# Patient Record
Sex: FEMALE | Race: WHITE | Employment: OTHER | URBAN - METROPOLITAN AREA
[De-identification: names, ages, dates, MRNs, and addresses within clinical notes are randomized per-mention and may not be internally consistent; named-entity substitution may affect disease eponyms.]

---

## 2017-07-21 ENCOUNTER — HOSPITAL ENCOUNTER (OUTPATIENT)
Dept: PHYSICAL THERAPY | Age: 77
Discharge: HOME | End: 2017-07-21
Payer: MEDICARE

## 2017-07-21 PROCEDURE — 97162 PT EVAL MOD COMPLEX 30 MIN: CPT

## 2017-07-21 PROCEDURE — 97112 NEUROMUSCULAR REEDUCATION: CPT

## 2017-07-21 PROCEDURE — G8979 MOBILITY GOAL STATUS: HCPCS

## 2017-07-21 PROCEDURE — G8978 MOBILITY CURRENT STATUS: HCPCS

## 2017-07-21 NOTE — PROGRESS NOTES
Physical Therapy Evaluation - General  See POC for subjective and objective details    Comments:   Patient is assigned a medium evaluation complexity based upon presence of multiple comorbidities, FOTO score, and chaning/progressing symptom characteristics.     Time In/Out: 10:10:50  Pain In/Out: 0, 0

## 2017-07-21 NOTE — PROGRESS NOTES
[facility] AT [address], [address] [place] [address]  Phone: [phone]   [phone]  PLAN OF CARE / [address] [place] [address] PHYSICAL THERAPY SERVICES  Patient Name: Adrienne Mcclain : 1940   Medical   Diagnosis: Vestibular disorder [H81.90] Treatment Diagnosis: H81.90   Onset Date:      Referral Source: [facility], Not On File, MD Start of Care [facility]): 2017   Prior Hospitalization: See medical history Provider #: [id number]   Prior Level of Function: Retired, independent w/ ADL's   Comorbidities: Heart disease, falls, CA history   Medications: Verified on Patient Summary List   The Plan of Care and following information is based on the information from the initial evaluation.   ===========================================================================================  Assessment / key information:  Patient is a 67 y/o female presenting with chief c/o imbalance and gait difficulty, progressively worse since . Pt has history of a fall in  with pelvic fracture and was diagnosed with acoustic neuroma in . Pt reports she is unsteady when she walks and has difficulty getting up from a chair. She is not using a cane but has one at home she has used for stairs as previously instructed. She denies recent falls, but has had close calls. She denies dizziness, and states she can walk about 2 blocks. Patient presents with decreased gait speed and widened CHRISTIANE, no assistive device. FGA score is 13/30 indicating elevated fall risk. Standing balance deficits are moderate with narrow CHRISTIANE and standard Romberg test, mod/severe deficits with foam surface or modified sharpened Romberg. Pt requires mod/max arm push-off support to perform sit to stand. The patient was instructed in a home exercise program to address the above findings/deficits.  The patient should benefit from therapy services to progress toward functional goals and improve quality of life.  ===========================================================================================  History HIGH Complexity :3+ comorbidities / personal factors will impact the outcome/ POC ; Examination HIGH Complexity : 4+ Standardized tests and measures addressing body structure, function, activity limitation and / or participation in recreation ; Presentation MEDIUM Complexity : Evolving with changing characteristics ; Decision Making MEDIUM Complexity : FOTO score of 26-74; Complexity MEDIUM  Problem List: decrease strength, impaired gait/ balance, decrease ADL/ functional abilitiies, decrease activity tolerance, decrease flexibility/ joint mobility and decrease transfer abilities   Treatment Plan may include any combination of the following: Therapeutic exercise, Neuromuscular re-education, Gait/balance training, Patient education and Functional mobility training  Patient / Family readiness to learn indicated by: asking questions, trying to perform skills and interest  Persons(s) to be included in education: patient (P)  Barriers to Learning/Limitations: None  Measures taken:    Patient Goal (s): Walk straight without bumping into things   Patient self reported health status: fair  Rehabilitation Potential: good   Short Term Goals: To be accomplished in  3-4  weeks: Independent/compliant with gaze stabilization exercises 3x/day as directed  Perform modified sharpened Romberg balance for 30 seconds without loss of balance   Long Term Goals:  To be accomplished in  4-8  weeks:  Improve FGA score by 4 points to indicate meaningful improvement in gait quality  Perform standing x1 gaze stabilization exercises without loss of balance to improve safety with head position changes  Pt will be able to perform sit to stand transfer with min/mod upper extremity push-off assistance from chair  Frequency / Duration:   Patient to be seen  2  times per week for 6-8  weeks:  Patient / Caregiver education and instruction: activity modification and exercises  G-Codes (GP): Mobility [id number] Current  CK= 40-59%   Goal  CK= 40-59%. The severity rating is based on the Other Functional Gait Assessment    Therapist Signature: Hawa Carrion PT, OCS Date: 3/21/4423   Certification Period: 7/21/17-10/20/17 Time: 10:12 AM   ===========================================================================================  I certify that the above Physical Therapy Services are being furnished while the patient is under my care. I agree with the treatment plan and certify that this therapy is necessary. Physician Signature:        Date:       Time:     Please sign and return to [facility] or you may fax the signed copy to [phone]. Thank you.

## 2017-07-25 ENCOUNTER — HOSPITAL ENCOUNTER (OUTPATIENT)
Dept: PHYSICAL THERAPY | Age: 77
Discharge: HOME | End: 2017-07-25
Payer: MEDICARE

## 2017-07-25 PROCEDURE — 97110 THERAPEUTIC EXERCISES: CPT

## 2017-07-25 PROCEDURE — 97112 NEUROMUSCULAR REEDUCATION: CPT

## 2017-07-28 ENCOUNTER — HOSPITAL ENCOUNTER (OUTPATIENT)
Dept: PHYSICAL THERAPY | Age: 77
Discharge: HOME | End: 2017-07-28
Payer: MEDICARE

## 2017-07-28 PROCEDURE — 97112 NEUROMUSCULAR REEDUCATION: CPT

## 2017-07-28 PROCEDURE — 97110 THERAPEUTIC EXERCISES: CPT

## 2017-07-28 NOTE — PROGRESS NOTES
PT DAILY TREATMENT NOTE     Patient Name: Summer Number  Date:2017  : 1940  [x]  Patient  Verified  Payor: Anna Clark / Plan: [place] MED[place] & [place] / Product Type: Medicare /    In time:10:04  Out time:10:42  Total Treatment Time (min): 38  Total Timed Codes (min): 38  1:1 Treatment Time (min): 30   Visit #: 3 of     Treatment Area: Other abnormalities of gait and mobility [R26.89]    SUBJECTIVE  Pain Level (0-10 scale): 0  Any medication changes, allergies to medications, adverse drug reactions, diagnosis change, or new procedure performed?: [x] No    [] Yes (see summary sheet for update)  Subjective functional status/changes:   [] No changes reported  My legs felt pretty good after I was here last time, I really wasn't even sore. I tend to have the most problems with my balance with making turns and bending down with coming back up. OBJECTIVE      10 min Therapeutic Exercise:  [x] See flow sheet :   Rationale: increase strength to improve the patients ability to improve functional abilities     28 min Neuromuscular Re-education:  []  See flow sheet :   Rationale: improve coordination, improve balance and increase proprioception  to improve the patients ability to stand and walk with improved stability        min Patient Education: [x] Review HEP    [] Progressed/Changed HEP based on:   [] positioning   [] body mechanics   [] transfers   [] heat/ice application        Other Objective/Functional Measures:     Pain Level (0-10 scale) post treatment: 0    ASSESSMENT/Changes in Function: Pt presented with tendency to drift/veer to the left with walking with eyes closed today probably due to unilateral vestibular deficit with R acoustic neuroma. However, Pt was able to decrease CHRISTIANE with romberg stance on level/stable surface with her eyes closed with moderate challenge with proprioceptive/balance awareness today. Will continue to progress/advance patient within current POC as tolerated with monitoring symptoms. Patient will continue to benefit from skilled PT services to modify and progress therapeutic interventions, analyze and cue movement patterns, address imbalance/dizziness and instruct in home and community integration to attain remaining goals.      []  See Plan of Care  []  See progress note/recertification  []  See Discharge Summary         Progress towards goals / Updated goals:      PLAN  [x]  Upgrade activities as tolerated     []  Continue plan of care  []  Update interventions per flow sheet       []  Discharge due to:_  []  Other:_      Frankey Horseman, PTA 7/28/2017  10:03 AM

## 2017-08-02 ENCOUNTER — HOSPITAL ENCOUNTER (OUTPATIENT)
Dept: PHYSICAL THERAPY | Age: 77
Discharge: HOME | End: 2017-08-02
Payer: MEDICARE

## 2017-08-02 PROCEDURE — 97112 NEUROMUSCULAR REEDUCATION: CPT

## 2017-08-02 PROCEDURE — 97110 THERAPEUTIC EXERCISES: CPT

## 2017-08-04 ENCOUNTER — HOSPITAL ENCOUNTER (OUTPATIENT)
Dept: PHYSICAL THERAPY | Age: 77
Discharge: HOME | End: 2017-08-04
Payer: MEDICARE

## 2017-08-04 PROCEDURE — 97112 NEUROMUSCULAR REEDUCATION: CPT

## 2017-08-04 PROCEDURE — 97110 THERAPEUTIC EXERCISES: CPT

## 2017-08-04 NOTE — PROGRESS NOTES
PT DAILY TREATMENT NOTE     Patient Name: Raf Michaud  Date:2017  : 1940  [x]  Patient  Verified  Payor: [place] MEDICARE / Plan: [place] MEDICARE PART A & B / Product Type: Medicare /    In time:1027  Out time:1113  Total Treatment Time (min): 46  Total Timed Codes (min): 46  1:1 Treatment Time (min): 46   Visit #: 5 of     Treatment Area: Other abnormalities of gait and mobility [R26.89]    SUBJECTIVE  Pain Level (0-10 scale): 0  Any medication changes, allergies to medications, adverse drug reactions, diagnosis change, or new procedure performed?: [x] No    [] Yes (see summary sheet for update)  Subjective functional status/changes:   [] No changes reported  \"I feel okay. My balance is always not good\"    OBJECTIVE    15 min Therapeutic Exercise:  [] See flow sheet :   Rationale: increase strength to improve the patients ability to stand, walk and transfer    31 min Neuromuscular Re-education:  []  See flow sheet :   Rationale: improve balance and increase proprioception  to improve the patients ability to safely perform standing/walking ADL's as well as basic changes of head and body positions    Other Objective/Functional Measures:     Pain Level (0-10 scale) post treatment: 0    ASSESSMENT/Changes in Function: Pt was able to perform all balance and card exercises with only moderate difficulty. Pt demonstrated moderate veer with ambulation with eyes closed and moderate difficulty with backwards walking. Will continue to progress therex within current POC as patient is able. Patient will continue to benefit from skilled PT services to address imbalance/dizziness to attain remaining goals.      []  See Plan of Care  []  See progress note/recertification  []  See Discharge Summary         Progress towards goals / Updated goals:      PLAN  []  Upgrade activities as tolerated     [x]  Continue plan of care  []  Update interventions per flow sheet       []  Discharge due to:_  []  Other:_ Teresa Gore, PT 8/4/2017

## 2017-08-07 ENCOUNTER — HOSPITAL ENCOUNTER (OUTPATIENT)
Dept: PHYSICAL THERAPY | Age: 77
Discharge: HOME | End: 2017-08-07
Payer: MEDICARE

## 2017-08-07 PROCEDURE — 97110 THERAPEUTIC EXERCISES: CPT

## 2017-08-07 PROCEDURE — 97112 NEUROMUSCULAR REEDUCATION: CPT

## 2017-08-10 ENCOUNTER — HOSPITAL ENCOUNTER (OUTPATIENT)
Dept: PHYSICAL THERAPY | Age: 77
Discharge: HOME | End: 2017-08-10
Payer: MEDICARE

## 2017-08-10 PROCEDURE — 97112 NEUROMUSCULAR REEDUCATION: CPT

## 2017-08-10 PROCEDURE — 97110 THERAPEUTIC EXERCISES: CPT

## 2017-08-10 NOTE — PROGRESS NOTES
PT DAILY TREATMENT NOTE     Patient Name: Gali Beverly  Date:8/10/2017  : 1940  [x]  Patient  Verified  Payor: Enedelia Estrada / Plan: [place] MEDICARE PART A & B / Product Type: Medicare /    In time:2:04  Out time:2:40  Total Treatment Time (min): 36  Total Timed Codes (min): 36  1:1 Treatment Time (min): 36   Visit #: 7 of     Treatment Area: Other abnormalities of gait and mobility [R26.89]    SUBJECTIVE  Pain Level (0-10 scale): 0  Any medication changes, allergies to medications, adverse drug reactions, diagnosis change, or new procedure performed?: [x] No    [] Yes (see summary sheet for update)  Subjective functional status/changes:   [] No changes reported  I've had some slight dizziness today. OBJECTIVE    16 min Therapeutic Exercise:  [] See flow sheet :   Rationale: increase strength and improve balance to improve the patients ability to ambulate and transfer    20 min Neuromuscular Re-education:  []  See flow sheet :   Rationale: improve balance and increase proprioception  to improve the patients ability to safely perform standing/walking ADL's as well as basic changes of head and body positions    Pain Level (0-10 scale) post treatment: 0    ASSESSMENT/Changes in Function: Pt is still challenged with incorporation of walking with head turning, which will continue to be focused on with further therapy. Patient will continue to benefit from skilled PT services to address imbalance/dizziness to attain remaining goals.      []  See Plan of Care  []  See progress note/recertification  []  See Discharge Summary           PLAN  []  Upgrade activities as tolerated     [x]  Continue plan of care  []  Update interventions per flow sheet       []  Discharge due to:_  []  Other:_      Anoop Aguilera, PT 8/10/2017  11:39 AM

## 2017-08-14 ENCOUNTER — HOSPITAL ENCOUNTER (OUTPATIENT)
Dept: PHYSICAL THERAPY | Age: 77
Discharge: HOME | End: 2017-08-14
Payer: MEDICARE

## 2017-08-14 PROCEDURE — 97112 NEUROMUSCULAR REEDUCATION: CPT

## 2017-08-14 PROCEDURE — 97110 THERAPEUTIC EXERCISES: CPT

## 2017-08-14 NOTE — PROGRESS NOTES
PT DAILY TREATMENT NOTE     Patient Name: Claribel Cuello  Date:2017  : 1940  [x]  Patient  Verified  Payor: [place] MEDICARE / Plan: [place] MEDICARE PART A & B / Product Type: Medicare /    In time:1107  Out time:1200  Total Treatment Time (min): 53  Total Timed Codes (min): 53  1:1 Treatment Time (min):53   Visit #: 8 of     Treatment Area: Other abnormalities of gait and mobility [R26.89]    SUBJECTIVE  Pain Level (0-10 scale): 0  Any medication changes, allergies to medications, adverse drug reactions, diagnosis change, or new procedure performed?: [x] No    [] Yes (see summary sheet for update)  Subjective functional status/changes:   [] No changes reported  \"I am not doing so well today. I am very dizzy\"    OBJECTIVE    23 min Therapeutic Exercise:  [] See flow sheet :   Rationale: increase strength and improve balance to improve the patients ability to ambulate and transfer    30 min Neuromuscular Re-education:  []  See flow sheet :   Rationale: improve balance and increase proprioception  to improve the patients ability to safely perform standing/walking ADL's as well as basic changes of head and body positions    Pain Level (0-10 scale) post treatment: 0    ASSESSMENT/Changes in Function: Pt reports moderate challenge today with standing and walking card exercise. Pt demonstrated moderate challenge with standing and turning her head. Patient will continue to benefit from skilled PT services to address imbalance/dizziness to attain remaining goals.      []  See Plan of Care  []  See progress note/recertification  []  See Discharge Summary           PLAN  []  Upgrade activities as tolerated     [x]  Continue plan of care  []  Update interventions per flow sheet       []  Discharge due to:_  []  Other:_      Alida Gomez, PT 2017

## 2017-08-17 ENCOUNTER — HOSPITAL ENCOUNTER (OUTPATIENT)
Dept: PHYSICAL THERAPY | Age: 77
Discharge: HOME | End: 2017-08-17
Payer: MEDICARE

## 2017-08-17 PROCEDURE — 97112 NEUROMUSCULAR REEDUCATION: CPT

## 2017-08-17 PROCEDURE — 97110 THERAPEUTIC EXERCISES: CPT

## 2017-08-17 NOTE — PROGRESS NOTES
PT DAILY TREATMENT NOTE     Patient Name: Meme Alfaro  Date:2017  : 1940  [x]  Patient  Verified  Payor: Navneet Good / Plan: [place] MEDICARE PART A & B / Product Type: Medicare /    In time:11:03  Out time:11:49  Total Treatment Time (min): 46  Total Timed Codes (min): 46  1:1 Treatment Time (min):46    Visit #: 9     Treatment Area: Other abnormalities of gait and mobility [R26.89]    SUBJECTIVE  Pain Level (0-10 scale): 0  Any medication changes, allergies to medications, adverse drug reactions, diagnosis change, or new procedure performed?: [x] No    [] Yes (see summary sheet for update)  Subjective functional status/changes:   [] No changes reported  I haven't been doing my eye exercises since I was here last because it has been aggravating my dizziness, but my allergies have been flared up lately too which may have something to do with it too. OBJECTIVE      15 min Therapeutic Exercise:  [x] See flow sheet :   Rationale: increase strength and improve balance to improve the patients ability to ambulate and transfer    31 min Neuromuscular Re-education:  []  See flow sheet :   Rationale: improve balance and increase proprioception  to improve the patients ability to safely perform standing/walking ADL's as well as basic changes of head and body positions           min Patient Education: [x] Review HEP    [] Progressed/Changed HEP based on:   [] positioning   [] body mechanics   [] transfers   [] heat/ice application        Other Objective/Functional Measures:     Pain Level (0-10 scale) post treatment: 0    ASSESSMENT/Changes in Function: Pt presented with chief c/o moderate increase in dizziness especially with changes in direction possibly aggravated by performing vestibular gaze card x1 with ambulation last treatment. Pt did present with reproducible symptoms intermittently with ambulation style dynamic balance exercises today and was unable to tolerate vestibular gaze card exercises at end of treatment today. Will continue to progress/advance patient within current POC as tolerated with monitoring symptoms. Patient will continue to benefit from skilled PT services to modify and progress therapeutic interventions, analyze and cue movement patterns, address imbalance/dizziness and instruct in home and community integration to attain remaining goals.      []  See Plan of Care  []  See progress note/recertification  []  See Discharge Summary         Progress towards goals / Updated goals:      PLAN  [x]  Upgrade activities as tolerated     []  Continue plan of care  []  Update interventions per flow sheet       []  Discharge due to:_  []  Other:_      Kylah Spivey PTA 8/17/2017  11:16 AM

## 2017-08-21 ENCOUNTER — HOSPITAL ENCOUNTER (OUTPATIENT)
Dept: PHYSICAL THERAPY | Age: 77
Discharge: HOME | End: 2017-08-21
Payer: MEDICARE

## 2017-08-21 PROCEDURE — G8979 MOBILITY GOAL STATUS: HCPCS

## 2017-08-21 PROCEDURE — 97110 THERAPEUTIC EXERCISES: CPT

## 2017-08-21 PROCEDURE — G8978 MOBILITY CURRENT STATUS: HCPCS

## 2017-08-21 PROCEDURE — 97112 NEUROMUSCULAR REEDUCATION: CPT

## 2017-08-21 NOTE — PROGRESS NOTES
[facility] AT [place] [address], [place], [address] [place] [address] Road  Phone: [phone]   [phone]  PROGRESS NOTE  Patient Name: Carlos Mujica : 1940   Treatment/Medical Diagnosis: Other abnormalities of gait and mobility [R26.89]   Referral Source: Lora Peña NP     Date of Initial Visit: 17 Attended Visits: 10 Missed Visits: 0     SUMMARY OF TREATMENT  Therapeutic exercise for proprioceptive/balance awareness including various romberg stance balance exercises, ambulation style dynamic balance activities, vestibular gaze card exercises and HEP,  CURRENT STATUS  Patient reports minimal overall improvement from therapy since initial evaluation. Pt has made some improvement with balance/proprioceptive awareness with Romberg stance activities, but minimal improvement with ambulation style dynamic balance activities. Pt still need occasional CGA to maintain her balance with performing sharpened Romberg stance, but is able to perform vestibular gaze card exercises x1 in standing without loss of balance. Pt feels that she is near maximum medical benefit/potential from current POC and will be ready for discharge after completing the last 2 remaining visits left on current script. Will continue to progress/advance patient within current POC as tolerated with monitoring symptoms. Goal/Measure of Progress Goal Met? 1.  Perform modified sharpened Romberg balance for 30 seconds without loss of balance   Status at last Eval: Progressing Current Status: Improved, but not met no   2. Improve FGA score by 4 points to indicate meaningful improvement in gait quality   Status at last Eval: FGA score= 13/30 Current Status: FGA score= 14/30 no   3. Perform standing x1 gaze stabilization exercises without loss of balance to improve safety with head position changes   Status at last Eval: Progressing Current Status: Met yes   4.   Pt will be able to perform sit to stand transfer with min/mod upper extremity push-off assistance from chair   Status at last Eval: Progressing Current Status: Met, able to perform with mod push off yes     New Goals to be achieved in __2__  treatments:  1. Perform modified sharpened Romberg balance for 30 seconds without loss of balance   2. Improve FGA score by 4 points to indicate meaningful improvement in gait quality     G-codes: Mobility  Current  CK= 40-59%   Goal  CK= 40-59%. The severity rating is based on the Other Functional Gait Assessment  RECOMMENDATIONS  Continue with current POC for 2 remaining visits left on current script with advancing as tolerated, then reassess for discharge from therapy as planned/discussed. If you have any questions/comments please contact us directly at [phone]. Thank you for allowing us to assist in the care of your patient. LPTA Signature: Kyung Morales PTA  Date: 8/21/2017   PT Signature: MICHAEL Burciaga, IRAIDA   Time: 11:05 AM   NOTE TO PHYSICIAN:  PLEASE COMPLETE THE ORDERS BELOW AND FAX TO   [facility] UNIT: [phone]. If you are unable to process this request in 24 hours please contact our office: [phone].    ___ I have read the above report and request that my patient continue as recommended.   ___ I have read the above report and request that my patient continue therapy with the following changes/special instructions:_________________________________________________________   ___ I have read the above report and request that my patient be discharged from therapy.      Physician Signature:        Date:       Time:

## 2017-08-21 NOTE — PROGRESS NOTES
PT DAILY TREATMENT NOTE     Patient Name: Maribell Regan  Date:2017  : 1940  [x]  Patient  Verified  Payor: Mateo Ann / Plan: [place] MEDICARE PART A & B / Product Type: Medicare /    In time:11:02  Out time:11:42  Total Treatment Time (min): 40  Total Timed Codes (min): 40  1:1 Treatment Time (min): 28   Visit #: 10 of     Treatment Area: Other abnormalities of gait and mobility [R26.89]    SUBJECTIVE  Pain Level (0-10 scale): 0  Any medication changes, allergies to medications, adverse drug reactions, diagnosis change, or new procedure performed?: [x] No    [] Yes (see summary sheet for update)  Subjective functional status/changes:   [] No changes reported  My legs have still been feeling wobbly and if I am not dizzy I am always on the brink of feeling dizzy. OBJECTIVE       15 min Therapeutic Exercise:  [x] See flow sheet :   Rationale: increase strength and improve balance to improve the patients ability to ambulate and transfer      25 min Neuromuscular Re-education:  []  See flow sheet :   Rationale: improve balance and increase proprioception  to improve the patients ability to safely perform standing/walking ADL's as well as basic changes of head and body positions           min Patient Education: [x] Review HEP    [] Progressed/Changed HEP based on:   [] positioning   [] body mechanics   [] transfers   [] heat/ice application        Other Objective/Functional Measures:     Pain Level (0-10 scale) post treatment: 0    ASSESSMENT/Changes in Function:     Patient will continue to benefit from skilled PT services to modify and progress therapeutic interventions, analyze and cue movement patterns, address imbalance/dizziness and instruct in home and community integration to attain remaining goals.      []  See Plan of Care  [x]  See progress note/recertification  []  See Discharge Summary         Progress towards goals / Updated goals:  See Progress note/Physician update for full detailed progress towards established goals.     PLAN  [x]  Upgrade activities as tolerated     []  Continue plan of care  []  Update interventions per flow sheet       []  Discharge due to:_  []  Other:_      Sarah Sharma PTA 8/21/2017  10:58 AM

## 2017-08-24 ENCOUNTER — HOSPITAL ENCOUNTER (OUTPATIENT)
Dept: PHYSICAL THERAPY | Age: 77
Discharge: HOME | End: 2017-08-24
Payer: MEDICARE

## 2017-08-24 PROCEDURE — G8979 MOBILITY GOAL STATUS: HCPCS

## 2017-08-24 PROCEDURE — 97112 NEUROMUSCULAR REEDUCATION: CPT

## 2017-08-24 PROCEDURE — G8980 MOBILITY D/C STATUS: HCPCS

## 2017-08-24 PROCEDURE — 97110 THERAPEUTIC EXERCISES: CPT

## 2017-08-24 NOTE — PROGRESS NOTES
[facility] AT [address], [address] [place] [address]  Phone: [phone]   [phone]  DISCHARGE SUMMARY  Patient Name: Claribel Cuello : 1940   Treatment/Medical Diagnosis: Other abnormalities of gait and mobility [R26.89]   Referral Source: Laurita Clark NP     Date of Initial Visit: 17 Attended Visits: 11 Missed Visits: 0     SUMMARY OF TREATMENT  Therapeutic exercise for proprioceptive/balance awareness including various romberg stance balance exercises, ambulation style dynamic balance activities, vestibular gaze card exercises and HEP,  CURRENT STATUS  Patient reports minimal overall improvement from therapy since initial evaluation. Pt has made some improvement with balance/proprioceptive awareness with Romberg stance activities, but minimal improvement with ambulation style dynamic balance activities. Pt still need occasional CGA to maintain her balance with performing sharpened Romberg stance, but is able to perform vestibular gaze card exercises x1 in standing without loss of balance. Pt was given and instructed in an updated HEP for continued self maintenance of reduced sxs after D/C from therapy.       Goal/Measure of Progress Goal Met? 1.  Perform modified sharpened Romberg balance for 30 seconds without loss of balance   Status at last Eval: Improved, but not met Current Status: Improved, but not met no   2. Improve FGA score by 4 points to indicate meaningful improvement in gait quality   Status at last Eval: FGA score= 14/30 Current Status: FGA score= 14/30 no   G-codes=Mobility   Goal  CK= 40-59%  D/C  CK= 40-59%. The severity rating is based on the Other Functional Gait Assessment  RECOMMENDATIONS  Patient feels that she has achieved maximum medical benefit/potential from current POC after completing 11 of 12 prescribed visits and is ready for discharge from therapy at this time.     If you have any questions/comments please contact us directly at [phone]. Thank you for allowing us to assist in the care of your patient.     LPTA Signature: Gila Aquino PTA Date: 8/24/17   Therapist Signature: MICHAEL Vaca, [facility]   Time: 12:39 PM

## 2017-08-24 NOTE — PROGRESS NOTES
PT DAILY TREATMENT NOTE     Patient Name: Nury Orourke  Date:2017  : 1940  [x]  Patient  Verified  Payor: Naty Paulino / Plan: [place] / Product Type: Medicare /    In time:11:00  Out time:11:40  Total Treatment Time (min): 40  Total Timed Codes (min): 30  1:1 Treatment Time (min): 30   Visit #:     Treatment Area: Other abnormalities of gait and mobility [R26.89]    SUBJECTIVE  Pain Level (0-10 scale): R knee=8/10  Any medication changes, allergies to medications, adverse drug reactions, diagnosis change, or new procedure performed?: [x] No    [] Yes (see summary sheet for update)  Subjective functional status/changes:   [] No changes reported  My right knee is hurting really bad today from doing some cleaning in a squatted position for about a half an hour since I was here last, so I don't know hor much I will be able to do. OBJECTIVE         10 min Therapeutic Exercise:  [x] See flow sheet :   Rationale: increase strength and improve balance to improve the patients ability to ambulate and transfer      20 min Neuromuscular Re-education:  [x]  See flow sheet :   Rationale: improve balance and increase proprioception  to improve the patients ability to safely perform standing/walking ADL's as well as basic changes of head and body positions           min Patient Education: [x] Review HEP    [] Progressed/Changed HEP based on:   [] positioning   [] body mechanics   [] transfers   [] heat/ice application        Other Objective/Functional Measures:     Pain Level (0-10 scale) post treatment: 8/10 R knee    ASSESSMENT/Changes in Function:        []  See Plan of Care  []  See progress note/recertification  [x]  See Discharge Summary         Progress towards goals / Updated goals:  See discharge summary for full final detailed progress towards all established goals.      PLAN  []  Upgrade activities as tolerated     []  Continue plan of care  []  Update interventions per flow sheet       [x]  Discharge due to:Patient feels that she has achieved maximum medical benefit/potential from current POC after completing 11 of 12 prescribed visits and is ready for discharge from therapy at this time.   []  Other:_      Benji Gr, TELLO 8/24/2017  10:53 AM

## 2017-08-28 ENCOUNTER — APPOINTMENT (OUTPATIENT)
Dept: PHYSICAL THERAPY | Age: 77
End: 2017-08-28
Payer: MEDICARE

## 2017-08-31 ENCOUNTER — APPOINTMENT (OUTPATIENT)
Dept: PHYSICAL THERAPY | Age: 77
End: 2017-08-31
Payer: MEDICARE

## 2017-09-05 ENCOUNTER — APPOINTMENT (OUTPATIENT)
Dept: PHYSICAL THERAPY | Age: 77
End: 2017-09-05

## 2017-09-07 ENCOUNTER — APPOINTMENT (OUTPATIENT)
Dept: PHYSICAL THERAPY | Age: 77
End: 2017-09-07

## 2019-09-24 ENCOUNTER — HOSPITAL ENCOUNTER (OUTPATIENT)
Dept: PHYSICAL THERAPY | Age: 79
Discharge: HOME | End: 2019-09-24
Payer: MEDICARE

## 2019-09-24 PROCEDURE — 97110 THERAPEUTIC EXERCISES: CPT

## 2019-09-24 PROCEDURE — 97161 PT EVAL LOW COMPLEX 20 MIN: CPT

## 2019-09-24 NOTE — PROGRESS NOTES
PHYSICAL THERAPY - DAILY TREATMENT NOTE    Patient Name: Sisi Salvador        Date: 2019  : 1940   yes Patient  Verified  Visit #:   1     Insurance: Payor: Darrell Grey / Plan: [place] MEDICARE PART A & B / Product Type: Medicare /      In time: 9:58 Out time: 11:00   Total Treatment Time: 62     Medicare/BCBS Time Tracking (below)   Total Timed Codes (min):  62 1:1 Treatment Time:  62     TREATMENT AREA =  Unsteadiness on feet [R26.81]  Left knee pain [M25.562]  Knee pain, right [M25.561]    SUBJECTIVE  Pain Level (on 0 to 10 scale):  0  / 10   Medication Changes/New allergies or changes in medical history, any new surgeries or procedures?    no  If yes, update Summary List   Subjective Functional Status/Changes:  []  No changes reported     See POC          OBJECTIVE    See exam on chart for details on objective findings        15 min Therapeutic Exercise:  [x]  See flow sheet   Rationale:      increase ROM and increase strength to improve the patients ability to transfer and ambulate safely           Billed With/As:   [x] TE   [] TA   [] Neuro   [] Self Care Patient Education: [x] Review HEP    [] Progressed/Changed HEP based on:   [] positioning   [] body mechanics   [] transfers   [] heat/ice application    [] other:      Other Objective/Functional Measures:    See POC     Post Treatment Pain Level (on 0 to 10) scale:   0  / 10     ASSESSMENT  Assessment/Changes in Function:     See POC     []  See Progress Note/Recertification   Patient will continue to benefit from skilled PT services to modify and progress therapeutic interventions, address functional mobility deficits, address ROM deficits, address strength deficits, analyze and address soft tissue restrictions, analyze and cue movement patterns, analyze and modify body mechanics/ergonomics, assess and modify postural abnormalities and address imbalance/dizziness to attain remaining goals.    Progress toward goals / Updated goals:    See POC     PLAN  []  Upgrade activities as tolerated yes Continue plan of care   []  Discharge due to :    []  Other:      Therapist: Pasquale Klein.  Mainor Martinez, [place]    Date: 9/24/2019 Time: 9:56 AM     Future Appointments   Date Time Provider Birdie Petty   9/24/2019 10:00 AM Terrence Jain, PT [facility]

## 2019-09-27 ENCOUNTER — HOSPITAL ENCOUNTER (OUTPATIENT)
Dept: PHYSICAL THERAPY | Age: 79
Discharge: HOME | End: 2019-09-27
Payer: MEDICARE

## 2019-09-27 PROCEDURE — 97110 THERAPEUTIC EXERCISES: CPT

## 2019-09-27 NOTE — PROGRESS NOTES
PHYSICAL THERAPY - DAILY TREATMENT NOTE    Patient Name: Kim Cohn        Date: 2019  : 1940   yes Patient  Verified  Visit #:   2     Insurance: Payor: Ashwini Gal / Plan: [place] MEDICARE PART A & B / Product Type: Medicare /      In time: 9:49 Out time: 10:21   Total Treatment Time: 32     Medicare/BCBS Time Tracking (below)   Total Timed Codes (min):  32 1:1 Treatment Time:  32     TREATMENT AREA =  Unsteadiness on feet [R26.81]  Left knee pain [M25.562]  Knee pain, right [M25.561]    SUBJECTIVE  Pain Level (on 0 to 10 scale):  0  / 10   Medication Changes/New allergies or changes in medical history, any new surgeries or procedures?    no  If yes, update Summary List   Subjective Functional Status/Changes:  []  No changes reported     Pt states she tried her HEP at home and had some Left hip/groin pain with hip extension and had to stop. OBJECTIVE      32 min Therapeutic Exercise:  [x]  See flow sheet   Rationale:      increase ROM and increase strength to improve the patients ability to tolerate standing/walking activities         Billed With/As:   [x] TE   [] TA   [] Neuro   [] Self Care Patient Education: [x] Review HEP    [] Progressed/Changed HEP based on:   [] positioning   [] body mechanics   [] transfers   [] heat/ice application    [] other:      Other Objective/Functional Measures:    -new exercises added as per flow sheet with vc's provided for form/technique 100% of the time. -c/o pain to R knee with R step ups after 9 reps     Post Treatment Pain Level (on 0 to 10) scale:   0  / 10     ASSESSMENT  Assessment/Changes in Function:     Pt with mild fatigue noted after exercises, denies pain increase indicating appropriate challenge. Pt ed on DOMS and to continue with HEP as tolerated over the weekend.      []  See Progress Note/Recertification   Patient will continue to benefit from skilled PT services to modify and progress therapeutic interventions, address functional mobility deficits, address ROM deficits, address strength deficits, analyze and address soft tissue restrictions, analyze and cue movement patterns, analyze and modify body mechanics/ergonomics, assess and modify postural abnormalities and address imbalance/dizziness to attain remaining goals   Progress toward goals / Updated goals:    No significant changes yet due to 1st f/u. PLAN  []  Upgrade activities as tolerated yes Continue plan of care   []  Discharge due to :    []  Other:      Therapist: Jonathan Coe.  Samina Sarabia PT    Date: 9/27/2019 Time: 10:04 AM     Future Appointments   Date Time Provider Birdie Petty   10/8/2019  2:00 PM Andrés Mccurdy, PTA [facility]   10/11/2019 11:15 AM Asher NAZARIO, PT [facility]   10/15/2019  2:00 PM Ok Curb., PT [facility]   10/18/2019  2:00 PM Marcels Allegra, PTA [facility]   10/22/2019  2:00 PM Ok Curb., PT [facility]   10/25/2019  2:00 PM Marcels Allegra, PTA [facility]   10/29/2019  2:00 PM Ok Curb., PT [facility]   11/1/2019 10:30 AM Ok Curb., PT [facility]

## 2019-10-01 ENCOUNTER — APPOINTMENT (OUTPATIENT)
Dept: PHYSICAL THERAPY | Age: 79
End: 2019-10-01
Payer: MEDICARE

## 2019-10-04 ENCOUNTER — APPOINTMENT (OUTPATIENT)
Dept: PHYSICAL THERAPY | Age: 79
End: 2019-10-04
Payer: MEDICARE

## 2019-10-08 ENCOUNTER — HOSPITAL ENCOUNTER (OUTPATIENT)
Dept: PHYSICAL THERAPY | Age: 79
Discharge: HOME | End: 2019-10-08
Payer: MEDICARE

## 2019-10-08 PROCEDURE — 97110 THERAPEUTIC EXERCISES: CPT

## 2019-10-08 PROCEDURE — 97112 NEUROMUSCULAR REEDUCATION: CPT

## 2019-10-08 NOTE — PROGRESS NOTES
PT DAILY TREATMENT NOTE     Patient Name: Danish May  Date:10/8/2019  : 1940  [x]  Patient  Verified  Payor: [place] MEDICARE / Plan: [place] MEDICARE PART A & B / Product Type: Medicare /    In time:2:05  Out time:2:57  Total Treatment Time (min): 52  Total Timed Codes (min): 52  1:1 Treatment Time (min): 40   Visit #: 3 of     Treatment Area: Unsteadiness on feet [R26.81]  Left knee pain [M25.562]  Knee pain, right [M25.561]    SUBJECTIVE  Pain Level (0-10 scale): 0  Any medication changes, allergies to medications, adverse drug reactions, diagnosis change, or new procedure performed?: [x] No    [] Yes (see summary sheet for update)  Subjective functional status/changes:   [] No changes reported  I fell backwards in my bedroom yesterday when my knee buckled on me after I stood up from getting out of bed, but I didn't hurt myself. OBJECTIVE  42 min Therapeutic Exercise:  [x] See flow sheet :   Rationale: increase ROM and increase strength to improve the patients ability to tolerate standing/walking activities     10 min Neuromuscular Re-education:  [x]  See flow sheet :   Rationale: improve balance and increase proprioception  to improve the patients ability to increase confidence and stability with standing/ambulation ADL's        min Patient Education: [x] Review HEP    [] Progressed/Changed HEP based on:   [] positioning   [] body mechanics   [] transfers   [] heat/ice application        Other Objective/Functional Measures:     Pain Level (0-10 scale) post treatment: 0    ASSESSMENT/Changes in Function: Pt presented with chief c/o recent fall in her home where she fell backwards due to her right knee buckling into flexion requiring assistance to stand up. Pt was able to tolerate full normal therex regiment including addition of standing theraband TKE to address right knee instability as well as increasing reps with some other therex with min to mod challenge today. Will continue to progress/advance patient within current POC as tolerated with monitoring symptoms. Patient will continue to benefit from skilled PT services to modify and progress therapeutic interventions, address functional mobility deficits, address ROM deficits, address strength deficits and analyze and cue movement patterns to attain remaining goals. []  See Plan of Care  []  See progress note/recertification  []  See Discharge Summary         Progress towards goals / Updated goals: · Short Term Goals: To be accomplished in  2  weeks:  1. Pt will be educated in appropriate HEP to decrease pain, increase ROM, increase strength and return pt to PLOF.    2. Patient will report at least 25% functional improvement with standing, walking ADL's.      · Long Term Goals: To be accomplished in  4-6  weeks:  1. Pt will improve FOTO score to >/= 44 to demo a significant improvement in functional activity tolerance. 2. Pt will achieve >/= +5 GROC in order to promote increased activity tolerance. 3.  Increase score on Tinetti to >/=  21/28 indicating decreased fall risk with ambulation.    4.  Pt will demonstrate sit<->stand from standard chair height with minimal UE support x 1 attempt for improved ease with toilet transfers.       PLAN  [x]  Upgrade activities as tolerated     []  Continue plan of care  []  Update interventions per flow sheet       []  Discharge due to:_  []  Other:_      Eleno Paniagua PTA 10/8/2019  1:39 PM      Future Appointments   Date Time Provider Birdie Petty   10/8/2019  2:00 PM Tyron Russ, PTA [facility]   10/11/2019  3:30 PM Tyron Russ, PTA [facility]   10/15/2019  2:00 PM Loren Duane., PT [facility]   10/18/2019  2:00 PM Tyron Russ, PTA [facility]   10/22/2019  2:00 PM Loren Duane., PT [facility]   10/25/2019  2:00 PM Tyron Russ, PTA [facility]   10/29/2019  2:00 PM Loren Duane., PT [facility]   11/1/2019 10:30 AM Loren Ko PT [facility]

## 2019-10-11 ENCOUNTER — HOSPITAL ENCOUNTER (OUTPATIENT)
Dept: PHYSICAL THERAPY | Age: 79
Discharge: HOME | End: 2019-10-11
Payer: MEDICARE

## 2019-10-11 PROCEDURE — 97112 NEUROMUSCULAR REEDUCATION: CPT

## 2019-10-11 PROCEDURE — 97110 THERAPEUTIC EXERCISES: CPT

## 2019-10-11 NOTE — PROGRESS NOTES
PT DAILY TREATMENT NOTE     Patient Name: Fide Galo  Date:10/11/2019  : 1940  [x]  Patient  Verified  Payor: [place] MEDICARE / Plan: [place] MEDICARE PART A & B / Product Type: Medicare /    In time:3:34  Out time:4:28  Total Treatment Time (min): 54  Total Timed Codes (min): 54  1:1 Treatment Time (min): 47   Visit #: 4 of     Treatment Area: Unsteadiness on feet [R26.81]  Left knee pain [M25.562]  Knee pain, right [M25.561]    SUBJECTIVE  Pain Level (0-10 scale): right knee=8/10  Any medication changes, allergies to medications, adverse drug reactions, diagnosis change, or new procedure performed?: [x] No    [] Yes (see summary sheet for update)  Subjective functional status/changes:   [] No changes reported  My right knee has been hurting a lot on and off since I was here last and it has been black and blue from the fall that I took before I was here last, but it hasn't buckled on me since. OBJECTIVE      44 min Therapeutic Exercise:  [x] See flow sheet :   Rationale: increase ROM and increase strength to improve the patients ability to tolerate standing/walking activities    10 min Neuromuscular Re-education:  [x]  See flow sheet :   Rationale: improve balance and increase proprioception  to improve the patients ability to increase confidence and stability with standing/ambulation ADL's        min Patient Education: [x] Review HEP    [] Progressed/Changed HEP based on:   [] positioning   [] body mechanics   [] transfers   [] heat/ice application        Other Objective/Functional Measures:     Pain Level (0-10 scale) post treatment: 0    ASSESSMENT/Changes in Function: Pt was able to advance to close Romberg stance on foam with moderate challenge with proprioceptive/balance awareness today. Pt tends to be unsafe/impulsive with ambulation style drills without rollator as well as with transfers needing reminders for safety. Will continue to progress/advance patient within current POC as tolerated with monitoring symptoms. Patient will continue to benefit from skilled PT services to modify and progress therapeutic interventions, address functional mobility deficits, address ROM deficits, address strength deficits and analyze and cue movement patterns to attain remaining goals. []  See Plan of Care  []  See progress note/recertification  []  See Discharge Summary         Progress towards goals / Updated goals: · Short Term Goals: To be accomplished in  2  weeks:  1.  Pt will be educated in appropriate HEP to decrease pain, increase ROM, increase strength and return pt to OF.    2. Patient will report at least 25% functional improvement with standing, walking ADL's.10/11/19: Not Met, Pt reports minimal functional improvement with standing, walking ADL's since initial evaluation; approximately 3-5%     · Long Term Goals: To be accomplished in  4-6  weeks:  1. Pt will improve FOTO score to >/= 44 to demo a significant improvement in functional activity tolerance. 2. Pt will achieve >/= +5 GROC in order to promote increased activity tolerance. 3.  Increase score on Tinetti to >/=  21/28 indicating decreased fall risk with ambulation.    4.  Pt will demonstrate sit<->stand from standard chair height with minimal UE support x 1 attempt for improved ease with toilet transfers.       PLAN  [x]  Upgrade activities as tolerated     []  Continue plan of care  []  Update interventions per flow sheet       []  Discharge due to:_  []  Other:_      Herbert López PTA 10/11/2019  3:47 PM      Future Appointments   Date Time Provider Birdie Petty   10/15/2019  2:00 PM Bertrand Burleson, PT [facility]   10/18/2019  2:00 PM Roberta Martinez PTA [facility]   10/22/2019  2:00 PM Bertrand Burleson, PT [facility]   10/25/2019  2:00 PM Roberta Martinez PTA [facility]   10/29/2019  2:00 PM Bertrand Burleson, PT [facility]   11/1/2019 10:30 AM Bertrand Burleson, PT [facility]

## 2019-10-15 ENCOUNTER — HOSPITAL ENCOUNTER (OUTPATIENT)
Dept: PHYSICAL THERAPY | Age: 79
Discharge: HOME | End: 2019-10-15
Payer: MEDICARE

## 2019-10-15 PROCEDURE — 97110 THERAPEUTIC EXERCISES: CPT

## 2019-10-15 PROCEDURE — 97112 NEUROMUSCULAR REEDUCATION: CPT

## 2019-10-15 NOTE — PROGRESS NOTES
PHYSICAL THERAPY - DAILY TREATMENT NOTE    Patient Name: Cheryl Rivas        Date: 10/15/2019  : 1940   yes Patient  Verified  Visit #:   5     Insurance: Payor: Vince Hi / Plan: [place] MEDICARE PART A & B / Product Type: Medicare /      In time: 2:07 PM Out time: 3:01   Total Treatment Time: 54     Medicare/BCBS Time Tracking (below)   Total Timed Codes (min):  54 1:1 Treatment Time:  40     TREATMENT AREA =  Unsteadiness on feet [R26.81]  Left knee pain [M25.562]  Knee pain, right [M25.561]    SUBJECTIVE  Pain Level (on 0 to 10 scale):  7  / 10   Medication Changes/New allergies or changes in medical history, any new surgeries or procedures?    no  If yes, update Summary List   Subjective Functional Status/Changes:  []  No changes reported     Pt states she feels that she is getting stronger. Obtained OTC shoe inserts, but forgot to bring them in. She is interested in having therapist look at them before she opens the packaging; also wondering about possibly trying Vasyli inserts in clinic. OBJECTIVE      25  (39) min Therapeutic Exercise:  [x]  See flow sheet   Rationale:     increase ROM and increase strength to improve the patients ability to tolerate standing/walking activities      10 min Neuromuscular Re-ed: [x]  See flow sheet   Rationale:  improve balance and increase proprioception  to improve the patients ability to increase confidence and stability with standing/ambulation ADL's      5 min Gait Training:  _100__ feet with _R SPC__ device on level surfaces with _distant supervision__ level of assist; including asphalt terrain. Pt amb up/down curb step with SPC and close supervision; vc's for up with good, down with bad (RLE).    Rationale:     Billed With/As:   [x] TE   [] TA   [] Neuro   [] Self Care Patient Education: [x] Review HEP    [] Progressed/Changed HEP based on:   [] positioning   [] body mechanics   [] transfers   [] heat/ice application    [] other:      Other Objective/Functional Measures:    -cued to maintain TKE with toe taps; progressed to 6 inch  -c/o L knee pain with SLR at 5 reps; d/c'd and regressed to QS  -increased to 2.5# LAQ     Post Treatment Pain Level (on 0 to 10) scale:   0  / 10     ASSESSMENT  Assessment/Changes in Function:     Pt demonstrates slow, steady progress with functional LE strength demonstrating increased upright posture in standing and during standing exercises. Plan continued progression of PRE's to promote LTG attainment. []  See Progress Note/Recertification   Patient will continue to benefit from skilled PT services to modify and progress therapeutic interventions, address functional mobility deficits, address ROM deficits, address strength deficits and analyze and cue movement patterns to attain remaining goals. Progress toward goals / Updated goals: · Short Term Goals: To be accomplished in  2  weeks:  1.  Pt will be educated in appropriate HEP to decrease pain, increase ROM, increase strength and return pt to PLOF.    2. Patient will report at least 25% functional improvement with standing, walking ADL's.10/11/19: Not Met, Pt reports minimal functional improvement with standing, walking ADL's since initial evaluation; approximately 3-5%     · Long Term Goals: To be accomplished in  4-6  weeks:  1. Pt will improve FOTO score to >/= 44 to demo a significant improvement in functional activity tolerance. 2. Pt will achieve >/= +5 GROC in order to promote increased activity tolerance. 3.  Increase score on Tinetti to >/=  21/28 indicating decreased fall risk with ambulation.    4. Pt will demonstrate sit<->stand from standard chair height with minimal UE support x 1 attempt for improved ease with toilet transfers. PLAN  []  Upgrade activities as tolerated yes Continue plan of care   []  Discharge due to :    []  Other:      Therapist: Augusta Antonio.  Matthew Lowry, PT    Date: 10/15/2019 Time: 5:44 PM      Future Appointments Date Time Provider Birdie Petty   10/15/2019  2:00 PM Jina Plana., PT [facility]   10/18/2019  2:00 PM Benjamin Sanchez, PTA [facility]   10/22/2019  2:00 PM Jina Plana., PT [facility]   10/25/2019  2:00 PM Benjamin Sanchez, PTA [facility]   10/29/2019  2:00 PM Jina Plana., PT [facility]   11/1/2019 10:30 AM Jina Plana., PT [facility]

## 2019-10-18 ENCOUNTER — HOSPITAL ENCOUNTER (OUTPATIENT)
Dept: PHYSICAL THERAPY | Age: 79
Discharge: HOME | End: 2019-10-18
Payer: MEDICARE

## 2019-10-18 PROCEDURE — 97110 THERAPEUTIC EXERCISES: CPT

## 2019-10-18 PROCEDURE — 97112 NEUROMUSCULAR REEDUCATION: CPT

## 2019-10-18 NOTE — PROGRESS NOTES
PT DAILY TREATMENT NOTE     Patient Name: Елена Bunn  Date:10/18/2019  : 1940  [x]  Patient  Verified  Payor: Rubin Bloch / Plan: [place] MEDICARE PART A & B / Product Type: Medicare /    In time:11:30  Out time:12:26  Total Treatment Time (min): 56  Total Timed Codes (min): 56  1:1 Treatment Time (min): 56   Visit #: 6 of     Treatment Area: Unsteadiness on feet [R26.81]  Left knee pain [M25.562]  Knee pain, right [M25.561]    SUBJECTIVE  Pain Level (0-10 scale): 7/10 right knee   Any medication changes, allergies to medications, adverse drug reactions, diagnosis change, or new procedure performed?: [x] No    [] Yes (see summary sheet for update)  Subjective functional status/changes:   [] No changes reported  I feel like my balance has gotten a little bit better with walking around the house, but my knee is still bothering me a lot especially when I lay flat in the bed. OBJECTIVE      46 min Therapeutic Exercise:  [x] See flow sheet :   Rationale: increase ROM and increase strength to improve the patients ability to tolerate standing/walking activities     10 min Neuromuscular Re-education:  [x]  See flow sheet :   Rationale: improve balance and increase proprioception  to improve the patients ability to increase confidence and stability with standing/ambulation ADL's           min Patient Education: [x] Review HEP    [] Progressed/Changed HEP based on:   [] positioning   [] body mechanics   [] transfers   [] heat/ice application        Other Objective/Functional Measures:     Pain Level (0-10 scale) post treatment: 0    ASSESSMENT/Changes in Function: Pt presents with the most functional improvement with increased confidence with household standing/ambulation ADL's over the past 2-3 treatments. Pt was also able to tolerate increased resistance with theraband TKE as well as advancing to HHA with backwards walking with min to mod challenge today. Will continue to progress/advance patient within current POC as tolerated with monitoring symptoms. Patient will continue to benefit from skilled PT services to modify and progress therapeutic interventions, address functional mobility deficits, address ROM deficits, address strength deficits, analyze and cue movement patterns and address imbalance/dizziness to attain remaining goals. []  See Plan of Care  []  See progress note/recertification  []  See Discharge Summary         Progress towards goals / Updated goals: · Short Term Goals: To be accomplished in  2  weeks:  1.  Pt will be educated in appropriate HEP to decrease pain, increase ROM, increase strength and return pt to PLOF.    2. Patient will report at least 25% functional improvement with standing, walking ADL's.10/11/19: Not Met, Pt reports minimal functional improvement with standing, walking ADL's since initial evaluation; approximately 3-5%     · Long Term Goals: To be accomplished in  4-6  weeks:  1. Pt will improve FOTO score to >/= 44 to demo a significant improvement in functional activity tolerance. 2. Pt will achieve >/= +5 GROC in order to promote increased activity tolerance. 3.  Increase score on Tinetti to >/=  21/28 indicating decreased fall risk with ambulation.    4. Pt will demonstrate sit<->stand from standard chair height with minimal UE support x 1 attempt for improved ease with toilet transfers.     PLAN  [x]  Upgrade activities as tolerated     []  Continue plan of care  []  Update interventions per flow sheet       []  Discharge due to:_  []  Other:_      Shannan King, PTA 10/18/2019  11:42 AM      Future Appointments   Date Time Provider Birdie Petty   10/22/2019  2:00 PM Saige Knight., PT [facility]   10/25/2019  2:00 PM Lakisha Ribera PTA [facility]   10/29/2019  2:00 PM Saige Knight., PT [facility]   11/1/2019 10:30 AM Saige Knight., PT [facility]

## 2019-10-22 ENCOUNTER — HOSPITAL ENCOUNTER (OUTPATIENT)
Dept: PHYSICAL THERAPY | Age: 79
Discharge: HOME | End: 2019-10-22
Payer: MEDICARE

## 2019-10-22 PROCEDURE — 97112 NEUROMUSCULAR REEDUCATION: CPT

## 2019-10-22 PROCEDURE — 97110 THERAPEUTIC EXERCISES: CPT

## 2019-10-22 NOTE — PROGRESS NOTES
PT DAILY TREATMENT NOTE     Patient Name: Simon Connelly  Date:10/22/2019  : 1940  [x]  Patient  Verified  Payor: [place] MEDICARE / Plan: [place] MEDICARE PART A & B / Product Type: Medicare /    In time:2:03  Out time:2:58  Total Treatment Time (min): 54  Total Timed Codes (min): 54  1:1 Treatment Time (min): 54  Visit #: 7 of     Treatment Area: Unsteadiness on feet [R26.81]  Left knee pain [M25.562]  Knee pain, right [M25.561]    SUBJECTIVE  Pain Level (0-10 scale): 7/10  Any medication changes, allergies to medications, adverse drug reactions, diagnosis change, or new procedure performed?: [x] No    [] Yes (see summary sheet for update)  Subjective functional status/changes:   [] No changes reported  My legs have been feeling a little bit stronger lately, I was even able to swiffer the whole kitchen on my own yesterday,which I haven't been able to do in sometime. OBJECTIVE      44 min Therapeutic Exercise:  [x] See flow sheet :   Rationale: increase ROM and increase strength to improve the patients ability to tolerate standing/walking activities    10 min Neuromuscular Re-education:  []  See flow sheet :   Rationale: improve balance and increase proprioception  to improve the patients ability to increase confidence and stability with standing/ambulation ADL's           min Patient Education: [x] Review HEP    [] Progressed/Changed HEP based on:   [] positioning   [] body mechanics   [] transfers   [] heat/ice application        Other Objective/Functional Measures:     Pain Level (0-10 scale) post treatment: 0    ASSESSMENT/Changes in Function: Pt was able to advance from close CHRISTIANE to modified sharpened Romberg stance with moderate challenge with proprioceptive/balance awareness today. Otherwise, Pt reports the most significant functional improvement with ability to perform light standing household chores with increased confidence since last treatment. Will review detailed progress/goals for physician update next treatment with continuing to progress/advance within current POC/protocol as tolerated. Patient will continue to benefit from skilled PT services to modify and progress therapeutic interventions, address functional mobility deficits, address ROM deficits, address strength deficits, analyze and cue movement patterns and address imbalance/dizziness to attain remaining goals. []  See Plan of Care  []  See progress note/recertification  []  See Discharge Summary         Progress towards goals / Updated goals: · Short Term Goals: To be accomplished in  2  weeks:  1.  Pt will be educated in appropriate HEP to decrease pain, increase ROM, increase strength and return pt to PLOF.    2. Patient will report at least 25% functional improvement with standing, walking ADL's.10/11/19: Not Met, Pt reports minimal functional improvement with standing, walking ADL's since initial evaluation; approximately 3-5%     · Long Term Goals: To be accomplished in  4-6  weeks:  1. Pt will improve FOTO score to >/= 44 to demo a significant improvement in functional activity tolerance. 2. Pt will achieve >/= +5 GROC in order to promote increased activity tolerance. 3.  Increase score on Tinetti to >/=  21/28 indicating decreased fall risk with ambulation.    4. Pt will demonstrate sit<->stand from standard chair height with minimal UE support x 1 attempt for improved ease with toilet transfers.     PLAN  [x]  Upgrade activities as tolerated     []  Continue plan of care  []  Update interventions per flow sheet       []  Discharge due to:_  []  Other:_      Mahendra Reyes PTA 10/22/2019  2:10 PM      Future Appointments   Date Time Provider Birdie Petty   10/25/2019  2:00 PM Reny Downs PTA [facility]   10/29/2019  2:00 PM Hima Waters, PT [facility]   11/1/2019 10:30 AM Hima Waters, PT [facility]

## 2019-10-25 ENCOUNTER — HOSPITAL ENCOUNTER (OUTPATIENT)
Dept: PHYSICAL THERAPY | Age: 79
Discharge: HOME | End: 2019-10-25
Payer: MEDICARE

## 2019-10-25 PROCEDURE — 97110 THERAPEUTIC EXERCISES: CPT

## 2019-10-25 PROCEDURE — 97112 NEUROMUSCULAR REEDUCATION: CPT

## 2019-10-25 NOTE — PROGRESS NOTES
[facility] PHYSICAL THERAPY  [address], [place], [address] - Phone: [phone]  Fax: [phone] OF CARE/RECERTIFICATION FOR PHYSICAL THERAPY          Patient Name: Evangelist Huff : 1940   Treatment/Medical Diagnosis: Unsteadiness on feet [R26.81]  Left knee pain [M25.562]  Knee pain, right [M25.561]   Onset Date:     Referral Source: Michael Ribera MD Start of Care [facility] 2019   Prior Hospitalization: See Medical History Provider #: [id number]   Prior Level of Function: Ambulated in home/community with SPC. Lives with dtr/grddtr in [place] in a condo with 2 RUT (landing between) no rails. Comorbidities: Arthritis, Afib, osteoporosis, DM, HTN, [place] R ear, acoustic neuroma R, h/o pituitary CA s/p resection. Medications: Verified on Patient Summary List   [facility]: 8 Missed Visits: 0     Goal/Measure of Progress Goal Met? 1. Pt will be educated in appropriate HEP to decrease pain, increase ROM, increase strength and return pt to PLOF. Status at last Eval: Progressing  Current Status: Met yes   2. Patient will report at least 25% functional improvement with standing, walking ADL's. Status at last Eval: Progressing  Current Status: 80% improvement reported yes   3. Pt will improve FOTO score to >/= 44 to demo a significant improvement in functional activity tolerance. Status at last Eval: 39/100 Current Status: 41/100 no     Goal/Measure of Progress Goal Met? 4. Pt will achieve >/= +5 GROC in order to promote increased activity tolerance. Status at last Eval: Progressing  Current Status: GROC= +5 yes   5. Increase score on Tinetti to >/=   indicating decreased fall risk with ambulation. Status at last Eval:  Current Status:  yes   6. Pt will demonstrate sit<->stand from standard chair height with minimal UE support x 1 attempt for improved ease with toilet transfers.    Status at last Eval: Progressing  Current Status: Improved, but still needs moderate UE support and 2-3 attempts at times no     Key Functional Changes/Progress:   Patient reports approximately 50% overall improvement from therapy since initial evaluation with 7/10 pain level on average in right>left knee, increased to 8/10 at the worst with no particular change/increase in activity. Pt is making slow but steady progress with gaining LE strengthen and right knee stability as well as advancing with proprioceptive/balance awareness within current POC. Pt would benefit from addition of orthotic evaluation and fitting to assess and correct LE and foot alignment/mechanics. Pt would benefit from continued therapy for 6 additional visits to achieve maximum medical benefit/potential from current POC. Will continue to progress/advance patient within current POC as tolerated with monitoring symptoms. Knee strength measurements/MMT= Quads=4+/5; Hamstrings=5/5. TUG 23\" no AD (fall risk>14\")  Sit<->stand with multiple attempts, increased time and heavy UE assist from standard chair height    Problem List: pain affecting function, decrease ROM, decrease strength, impaired gait/ balance, decrease ADL/ functional abilitiies, decrease activity tolerance, decrease flexibility/ joint mobility and decrease transfer abilities   Treatment Plan may include any combination of the following: Therapeutic exercise, Therapeutic activities, Neuromuscular re-education, Physical agent/modality, Gait/balance training, Manual therapy, Patient education, Self Care training, Functional mobility training, Home safety training, Stair training and Other: orthotic evaluation and fitting  Patient Goal(s) has been updated and includes: To achieve a level of comfort with going out to public places with good strength and balance.  Goals for this certification period include and are to be achieved in   6-8  treatments:  1.  Pt will improve FOTO score to >/= 44 to demo a significant improvement in functional activity tolerance. 2. Pt will demonstrate sit<->stand from standard chair height with minimal UE support x 1 attempt for improved ease with toilet transfers. 3. Pt will improve TUG to </= 15\" with least restrictive device for decreased fall risk. Frequency / Duration:   Patient to be seen   1-2   times per week for   4    weeks:    Assessments/Recommendations:   Continue with current POC for 6 additional visits with advancing as tolerated, then reassess for the need for continuation or discharge from therapy. If you have any questions/comments please contact us directly at [phone]. Thank you for allowing us to assist in the care of your patient. Therapist Signature: Alston Lesches, PTA/   MICHAEL Hedrick Date: 68/11/5446   Certification Period:  Reporting Period: 09/24/19 to 12/22/19 09/24/19 to 10/25/19 Time: 2:16 PM   NOTE TO PHYSICIAN:  PLEASE COMPLETE THE ORDERS BELOW AND FAX TO   [facility]: [phone]  If you are unable to process this request in 24 hours please contact our office: [phone]    ___ I have read the above report and request that my patient continue as recommended.   ___ I have read the above report and request that my patient continue therapy with the following changes/special instructions: ________________________________________________   ___ I have read the above report and request that my patient be discharged from therapy.      Physician Signature:        Date:       Time:

## 2019-10-25 NOTE — PROGRESS NOTES
PT DAILY TREATMENT NOTE     Patient Name: Murtaza Ho  Date:10/25/2019  : 1940  [x]  Patient  Verified  Payor: [place] MEDICARE / Plan: [place] MEDICARE PART A & B / Product Type: Medicare /    In time:2:01 Out time:3:06  Total Treatment Time (min): 65  Total Timed Codes (min): 65  1:1 Treatment Time (min): 44   Visit #: 8 of     Treatment Area: Unsteadiness on feet [R26.81]  Left knee pain [M25.562]  Knee pain, right [M25.561]    SUBJECTIVE  Pain Level (0-10 scale): 7/10  Any medication changes, allergies to medications, adverse drug reactions, diagnosis change, or new procedure performed?: [x] No    [] Yes (see summary sheet for update)  Subjective functional status/changes:   [] No changes reported  I am having a bad day today today, my legs don't feel that strong today, so don't  me on my performance today. OBJECTIVE    53 min Therapeutic Exercise:  [x] See flow sheet :   Rationale: increase ROM and increase strength to improve the patients ability to tolerate standing/walking activities    12 min Neuromuscular Re-education:  []  See flow sheet :   Rationale: improve balance and increase proprioception  to improve the patients ability toincrease confidence and stability with standing/ambulation ADL's            min Patient Education: [x] Review HEP    [] Progressed/Changed HEP based on:   [] positioning   [] body mechanics   [] transfers   [] heat/ice application        Other Objective/Functional Measures:     Pain Level (0-10 scale) post treatment: 0    ASSESSMENT/Changes in Function:     Patient will continue to benefit from skilled PT services to modify and progress therapeutic interventions, address functional mobility deficits, address ROM deficits, address strength deficits, analyze and cue movement patterns and address imbalance/dizziness to attain remaining goals.      []  See Plan of Care  [x]  See progress note/recertification  []  See Discharge Summary         Progress towards goals / Updated goals:  See Progress note/Physician update for full detailed progress towards established goals.     PLAN  [x]  Upgrade activities as tolerated     []  Continue plan of care  []  Update interventions per flow sheet       []  Discharge due to:_  []  Other:_      Marina Buchanna, PTA 10/25/2019  2:10 PM      Future Appointments   Date Time Provider Birdie Petty   10/29/2019  2:00 PM Ok Cristi., PT [facility]   11/1/2019 10:30 AM Ok Cristi., PT [facility]

## 2019-10-27 PROBLEM — R07.2 PRECORDIAL CHEST PAIN: Status: ACTIVE | Noted: 2019-10-27

## 2019-10-27 PROBLEM — R77.8 ELEVATED TROPONIN LEVEL: Status: ACTIVE | Noted: 2019-10-27

## 2019-10-27 PROBLEM — I48.0 PAROXYSMAL ATRIAL FIBRILLATION (HCC): Status: ACTIVE | Noted: 2019-10-27

## 2019-10-29 ENCOUNTER — APPOINTMENT (OUTPATIENT)
Dept: PHYSICAL THERAPY | Age: 79
End: 2019-10-29
Payer: MEDICARE

## 2019-11-01 ENCOUNTER — APPOINTMENT (OUTPATIENT)
Dept: PHYSICAL THERAPY | Age: 79
End: 2019-11-01
Payer: MEDICARE

## 2019-11-01 ENCOUNTER — HOSPITAL ENCOUNTER (OUTPATIENT)
Dept: PHYSICAL THERAPY | Age: 79
Discharge: HOME | End: 2019-11-01
Payer: MEDICARE

## 2019-11-01 PROCEDURE — 97112 NEUROMUSCULAR REEDUCATION: CPT

## 2019-11-01 PROCEDURE — 97110 THERAPEUTIC EXERCISES: CPT

## 2019-11-01 NOTE — PROGRESS NOTES
PHYSICAL THERAPY - DAILY TREATMENT NOTE    Patient Name: Rasta Raymundo        Date: 2019  : 1940   yes Patient  Verified  Visit #:     Insurance: Payor: Jessy Valentin / Plan: [place] MEDICARE PART A & B / Product Type: Medicare /      In time: 10:42 Out time: 11:25   Total Treatment Time: 43     Medicare/BCBS Time Tracking (below)   Total Timed Codes (min):  43 1:1 Treatment Time:  43     TREATMENT AREA =  Unsteadiness on feet [R26.81]  Left knee pain [M25.562]  Knee pain, right [M25.561]    SUBJECTIVE  Pain Level (on 0 to 10 scale):  7  / 10   Medication Changes/New allergies or changes in medical history, any new surgeries or procedures?    no  If yes, update Summary List   Subjective Functional Status/Changes:  []  No changes reported     Pt recently hospitalized 10/27-10/29 for tachycardia; she states she feels a little more tired as a result, but her heart is functioning well and she has been cleared for return to therapy by MD (see script in chart).           OBJECTIVE      35 min Therapeutic Exercise:  [x]  See flow sheet   Rationale:     increase ROM and increase strength to improve the patients ability to tolerate standing/walking activities      8 min Neuromuscular Re-ed: [x]  See flow sheet   Rationale:   improve balance and increase proprioception  to improve the patients ability toincrease confidence and stability with standing/ambulation ADL's       Billed With/As:   [] TE   [] TA   [] Neuro   [] Self Care Patient Education: [x] Review HEP    [] Progressed/Changed HEP based on:   [] positioning   [] body mechanics   [] transfers   [] heat/ice application    [] other:      Other Objective/Functional Measures:    -exercises regressed to tolerance for recent decline in medical status (see flow sheet for details)     Post Treatment Pain Level (on 0 to 10) scale:    10     ASSESSMENT  Assessment/Changes in Function:     Pt with decreased strength/endurance related to recent hospitalization. Pt ed on activity pacing and to ambulate to tolerance in home 2 x/day with seated LE HEP to tolerance. []  See Progress Note/Recertification   Patient will continue to benefit from skilled PT services to modify and progress therapeutic interventions, address functional mobility deficits, address ROM deficits, address strength deficits, analyze and address soft tissue restrictions, analyze and cue movement patterns, analyze and modify body mechanics/ergonomics, address imbalance/dizziness and instruct in home and community integration to attain remaining goals. Progress toward goals / Updated goals:    No objective improvements due to recent hospitalization     PLAN  []  Upgrade activities as tolerated yes Continue plan of care   []  Discharge due to :    []  Other:      Therapist: Andrez Almonte.  Michelle Cain, PT    Date: 11/1/2019 Time: 9:32 AM     Future Appointments   Date Time Provider Birdie Petty   11/18/2019  2:15 PM Alisha Brooks [facility]   11/20/2019 11:45 AM TELLO Alex [facility]   11/27/2019  3:30 PM TELLO Alex [facility]

## 2019-11-08 ENCOUNTER — HOSPITAL ENCOUNTER (OUTPATIENT)
Dept: PHYSICAL THERAPY | Age: 79
Discharge: HOME | End: 2019-11-08
Payer: MEDICARE

## 2019-11-08 PROCEDURE — 97112 NEUROMUSCULAR REEDUCATION: CPT

## 2019-11-08 PROCEDURE — 97110 THERAPEUTIC EXERCISES: CPT

## 2019-11-08 NOTE — PROGRESS NOTES
PHYSICAL THERAPY - DAILY TREATMENT NOTE      Patient Name: Chen Lyles        Date: 2019  : 1940   YES Patient  Verified  Visit #:   10   of   14                       Insurance: Payor: Hugh Trinidad / Plan: [place] MEDICARE PART A & B / Product Type: Medicare /       In time: 1045 Out time: 11:27   Total Treatment Time: 40          Medicare/BCBS Time Tracking (below)   Total Timed Codes (min):  40 1:1 Treatment Time:  40      TREATMENT AREA =  Unsteadiness on feet [R26.81]  Left knee pain [M25.562]  Knee pain, right [M25.561]     SUBJECTIVE  Pain Level (on 0 to 10 scale):  7  / 10   Medication Changes/New allergies or changes in medical history, any new surgeries or procedures?    no  If yes, update Summary List   Subjective Functional Status/Changes:  []  No changes reported      Pt reports that she has been doing well since her recent hospitalization. She felt fine after her last session         OBJECTIVE        32 min Therapeutic Exercise:  [x]  See flow sheet   Rationale:     increase ROM and increase strength to improve the patients ability to tolerate standing/walking activities        8 min Neuromuscular Re-ed: [x]  See flow sheet   Rationale:   improve balance and increase proprioception  to improve the patients ability toincrease confidence and stability with standing/ambulation ADL's         Billed With/As:   [] TE   [] TA   [] Neuro   [] Self Care Patient Education: [x] Review HEP    [] Progressed/Changed HEP based on:   [] positioning   [] body mechanics   [] transfers   [] heat/ice application    [] other:           Other Objective/Functional Measures:     -exercises mildly progressed today to Pt tolerance, see flow sheet for details.       Post Treatment Pain Level (on 0 to 10) scale:    10      ASSESSMENT  Assessment/Changes in Function:    Therex was performed to Pt tolerance today as recent hospitalization has resulted in dec endurance/strength.  Pt was able to tolerate mild inc in therex and balance training with seated rest breaks as needed to recover energy. Pt was questioned throughout session for sx of maia gonsales, Pt verbalized no significant sx, only mm  fatigue.       []  See Progress Note/Recertification   Patient will continue to benefit from skilled PT services to modify and progress therapeutic interventions, address functional mobility deficits, address ROM deficits, address strength deficits, analyze and address soft tissue restrictions, analyze and cue movement patterns, analyze and modify body mechanics/ergonomics, address imbalance/dizziness and instruct in home and community integration to attain remaining goals.    Progress toward goals / Updated goals:     No objective improvements due to recent hospitalization      PLAN      [x]  Upgrade activities as tolerated YES Continue plan of care   []  Discharge due to :    []  Other:      Therapist: Lee Mcgowan    Date: 11/8/2019 Time: 10:10 AM

## 2019-11-20 ENCOUNTER — HOSPITAL ENCOUNTER (OUTPATIENT)
Dept: PHYSICAL THERAPY | Age: 79
Discharge: HOME | End: 2019-11-20
Payer: MEDICARE

## 2019-11-20 PROCEDURE — 97140 MANUAL THERAPY 1/> REGIONS: CPT

## 2019-11-20 PROCEDURE — 97110 THERAPEUTIC EXERCISES: CPT

## 2019-11-20 NOTE — PROGRESS NOTES
PT DAILY TREATMENT NOTE     Patient Name: Gilma Shelton  Date:2019  : 1940  [x]  Patient  Verified  Payor: [place] MEDICARE / Plan: [place] MEDICARE PART A & B / Product Type: Medicare /    In time:11:53  Out time:12:44  Total Treatment Time (min): 51  Total Timed Codes (min): 51  1:1 Treatment Time (min): 51   Visit #: 11 of 14    Treatment Area: Unsteadiness on feet [R26.81]  Left knee pain [M25.562]  Knee pain, right [M25.561]    SUBJECTIVE  Pain Level (0-10 scale): 0  Any medication changes, allergies to medications, adverse drug reactions, diagnosis change, or new procedure performed?: [x] No    [] Yes (see summary sheet for update)  Subjective functional status/changes:   [] No changes reported  I couldn't make it on Monday because I fell at home and couldn't get up until I called someone to help me get up. I was walking in my slippers without the cane and my slipper got caught up on something and I fell backwards. OBJECTIVE    31 min Therapeutic Exercise:  [x] See flow sheet :   Rationale:  increase ROM and increase strength to improve the patients ability to tolerate standing/walking activities    20 min Neuromuscular Re-education:  [x]  See flow sheet :   Rationale: improve balance and increase proprioception  to improve the patients ability toincrease confidence and stability with standing/ambulation ADL's            min Patient Education: [x] Review HEP    [] Progressed/Changed HEP based on:   [] positioning   [] body mechanics   [] transfers   [] heat/ice application        Other Objective/Functional Measures:     Pain Level (0-10 scale) post treatment: 0    ASSESSMENT/Changes in Function: Pt reported a recent fall onto her back while ambulating around her home without assistive device since last treatment in which she required assistance to stand.  Pt was educated on fall prevention safety and using appropriate assistive device at all times as well as on procedure with getting up from floor independently today. Pt was able to tolerate full normal therex regiment including increased laps with side stepping and HHA retro walking with min to mod challenge today. Will continue to progress/advance patient within current POC as tolerated with monitoring symptoms. Patient will continue to benefit from skilled PT services to modify and progress therapeutic interventions, address functional mobility deficits, address ROM deficits, address strength deficits, analyze and cue movement patterns and address imbalance/dizziness to attain remaining goals. []  See Plan of Care  []  See progress note/recertification  []  See Discharge Summary         Progress towards goals / Updated goals:  · Goals for this certification period include and are to be achieved in   6-8  treatments:  1. Pt will improve FOTO score to >/= 44 to demo a significant improvement in functional activity tolerance. 2. Pt will demonstrate sit<->stand from standard chair height with minimal UE support x 1 attempt for improved ease with toilet transfers. 3. Pt will improve TUG to </= 15\" with least restrictive device for decreased fall risk.     PLAN  [x]  Upgrade activities as tolerated     []  Continue plan of care  []  Update interventions per flow sheet       []  Discharge due to:_  []  Other:_      Mallory Marcelo PTA 11/20/2019  11:57 AM      Future Appointments   Date Time Provider Birdie Petty   11/27/2019  3:30 PM Teddy Gamble [facility]   12/3/2019 10:00 AM TELLO [facility]   12/6/2019  9:15 AM Anuradha Vásquez PTA [facility]

## 2019-11-27 ENCOUNTER — HOSPITAL ENCOUNTER (OUTPATIENT)
Dept: PHYSICAL THERAPY | Age: 79
Discharge: HOME | End: 2019-11-27
Payer: MEDICARE

## 2019-11-27 PROCEDURE — 97110 THERAPEUTIC EXERCISES: CPT

## 2019-11-27 PROCEDURE — 97112 NEUROMUSCULAR REEDUCATION: CPT

## 2019-11-27 NOTE — PROGRESS NOTES
PT DAILY TREATMENT NOTE     Patient Name: Cheryl Rivas  Date:2019  : 1940  [x]  Patient  Verified  Payor: Vince Hi / Plan: [place] MEDICARE PART A & B / Product Type: Medicare /    In time:3:43  Out time:4:33  Total Treatment Time (min): 50  Total Timed Codes (min): 50  1:1 Treatment Time (min): 50   Visit #: 12 of 14    Treatment Area: Unsteadiness on feet [R26.81]  Left knee pain [M25.562]  Knee pain, right [M25.561]    SUBJECTIVE  Pain Level (0-10 scale): 0  Any medication changes, allergies to medications, adverse drug reactions, diagnosis change, or new procedure performed?: [x] No    [] Yes (see summary sheet for update)  Subjective functional status/changes:   [] No changes reported  My knees have been a little bit more achy from the cold damp weather lately, but they don't hurt if I am not doing anything. I still feel like one leg is shorter than the other when I am walking.     OBJECTIVE    20 min Therapeutic Exercise:  [x] See flow sheet :   Rationale:  increase ROM and increase strength to improve the patients ability to tolerate standing/walking activities    30 min Neuromuscular Re-education:  [x]  See flow sheet :   Rationale:  improve balance and increase proprioception  to improve the patients ability toincrease confidence and stability with standing/ambulation ADL's                                                                          min Patient Education: [x] Review HEP    [] Progressed/Changed HEP based on:   [] positioning   [] body mechanics   [] transfers   [] heat/ice application        Other Objective/Functional Measures:     Pain Level (0-10 scale) post treatment: 0    ASSESSMENT/Changes in Function:     Patient will continue to benefit from skilled PT services to modify and progress therapeutic interventions, address functional mobility deficits, address ROM deficits, address strength deficits, analyze and cue movement patterns, address imbalance/dizziness and instruct in home and community integration to attain remaining goals. []  See Plan of Care  [x]  See progress note/recertification  []  See Discharge Summary         Progress towards goals / Updated goals:  See Progress note/Physician update for full detailed progress towards established goals.     PLAN  [x]  Upgrade activities as tolerated     []  Continue plan of care  []  Update interventions per flow sheet       []  Discharge due to:_  []  Other:_      Marina Buchanan, TELLO 11/27/2019  3:40 PM      Future Appointments   Date Time Provider Birdie Petty   12/3/2019 10:00 AM Andrés Mccurdy PTA [facility]   12/6/2019  9:15 AM Andrés Mccurdy PTA [facility]

## 2019-11-27 NOTE — PROGRESS NOTES
[address] [facility] PHYSICAL THERAPY  [address], [place], [address] - Phone: [phone]  Fax: [phone] OF CARE/RECERTIFICATION FOR PHYSICAL THERAPY          Patient Name: Yoon Mera : 1940   Treatment/Medical Diagnosis: Unsteadiness on feet [R26.81]  Left knee pain [M25.562]  Knee pain, right [M25.561]   Onset Date: Unsteadiness on feet [R26.81]  Left knee pain [M25.562]  Knee pain, right [M25.561]    Referral Source: Chang Myers MD Start of Care [facility]): 2019   Prior Hospitalization: See Medical History Provider #: [id number]   Prior Level of Function: Ambulated in home/community with [facility].  Lives with dtr/grddtr in [place] in a condo with 2 RUT (landing between) no rails. Comorbidities: Arthritis, Afib, osteoporosis, DM, HTN, [place] R ear, acoustic neuroma R, h/o pituitary CA s/p resection. Medications: Verified on Patient Summary List   Visits from [facility]: 12 Missed Visits: 0     Goal/Measure of Progress Goal Met? 1. Pt will improve FOTO score to >/= 44 to demo a significant improvement in functional activity tolerance. Status at last Eval: 41/100 (39/100 at initial evaluation) Current Status: 45/100 yes   2. Pt will demonstrate sit<->stand from standard chair height with minimal UE support x 1 attempt for improved ease with toilet transfers. Status at last Eval: Improved, but still needs moderate UE support and 2-3 attempts at times Current Status: Improved, but still needs moderate UE support and 2-3 attempts at times no   3. Pt will improve TUG to </= 15\" with least restrictive device for decreased fall risk.    Status at last Eval: TUG 23\" no AD (fall risk>14\") Current Status: TUG 20.5\" no AD (fall risk>14\") no     Key Functional Changes/Progress:   Patient reports approximately 75-80% overall improvement from therapy since initial evaluation with 7/10 pain level on average in right knee, increased to 8/10 at the worst with ascending from chair after prolonged sitting as well as with squatting type activities. Pt is still the safest with rollator walker as the least restrictive assistive device for community ambulation but could work towards being independent with straight cane household ambulation with more consistent attendance with therapy. Pt has had occasional falls at home at least once while attending therapy with choosing to ambulate around her home without assistive device in which she required assistance to get up from the floor. Pt was strongly advised against ambulation without assistive device around her home several times for her own safety. Pt would benefit from more consistent attendance with therapy with focusing on LE strength and endurance as well as proprioceptive/balance awareness. Pt would benefit from continued therapy for 2-4 additional visits to achieve maximum medical benefit/potential from current POC. Will continue to progress/advance patient within current POC as tolerated with monitoring symptoms. Knee strength measurements/MMT= Quads=4+/5; Hamstrings=5/5.   TUG 20.5\" no AD (fall risk>14\")  Sit<->stand with multiple attempts, increased time and moderate UE assist from standard chair height  Problem List: pain affecting function, decrease ROM, decrease strength, impaired gait/ balance, decrease ADL/ functional abilitiies, decrease activity tolerance, decrease flexibility/ joint mobility and decrease transfer abilities   Treatment Plan may include any combination of the following: Therapeutic exercise, Therapeutic activities, Neuromuscular re-education, Physical agent/modality, Gait/balance training, Manual therapy, Patient education, Self Care training, Functional mobility training, Home safety training, Stair training and Other: orthotic evaluation and fitting   Patient Goal(s) has been updated and includes:  \"I I want to be able to walk more steady around the house with the cane rather than the walker. \"    Goals for this certification period include and are to be achieved in   2-4  treatments: 1. Pt will demonstrate sit<->stand from standard chair height with minimal UE support x 1 attempt for improved ease with toilet transfers. 2.Pt will improve TUG to </= 17\" with least restrictive device for decreased fall risk. Frequency / Duration:   Patient to be seen   1-2   times per week for   2-4    treatments:    Assessments/Recommendations:   Continue with current POC for 2-4 additional visits with advancing as tolerated, then reassess for the need for continuation or discharge from therapy. If you have any questions/comments please contact us directly at [phone]. Thank you for allowing us to assist in the care of your patient. Therapist Signature: Roma Carrillo PTA/ MICHAEL Lozano Date: 57/32/5925   Certification Period:  Reporting Period: 09/24/19 to 12/22/19 09/24/19 to 11/27/19 Time: 3:47 PM   NOTE TO PHYSICIAN:  PLEASE COMPLETE THE ORDERS BELOW AND FAX TO   [facility]: [phone]  If you are unable to process this request in 24 hours please contact our office: [phone]    ___ I have read the above report and request that my patient continue as recommended.   ___ I have read the above report and request that my patient continue therapy with the following changes/special instructions: ________________________________________________   ___ I have read the above report and request that my patient be discharged from therapy.      Physician Signature:        Date:       Time:

## 2019-12-03 ENCOUNTER — HOSPITAL ENCOUNTER (OUTPATIENT)
Dept: PHYSICAL THERAPY | Age: 79
Discharge: HOME | End: 2019-12-03
Payer: MEDICARE

## 2019-12-03 PROCEDURE — 97110 THERAPEUTIC EXERCISES: CPT

## 2019-12-03 PROCEDURE — 97112 NEUROMUSCULAR REEDUCATION: CPT

## 2019-12-03 NOTE — PROGRESS NOTES
PT DAILY TREATMENT NOTE     Patient Name: Nya Romero  Date:12/3/2019  : 1940  [x]  Patient  Verified  Payor: VA MEDICARE / Plan: VA MEDICARE PART A & B / Product Type: Medicare /    In time:10:18  Out time:10:47  Total Treatment Time (min): 29  Total Timed Codes (min): 29  1:1 Treatment Time (min): 29   Visit #: 13 of     Treatment Area: Unsteadiness on feet [R26.81]  Left knee pain [M25.562]  Knee pain, right [M25.561]    SUBJECTIVE  Pain Level (0-10 scale): 0  Any medication changes, allergies to medications, adverse drug reactions, diagnosis change, or new procedure performed?: [x] No    [] Yes (see summary sheet for update)  Subjective functional status/changes:   [] No changes reported  I have been walking around the house without anything a little bit more, but I went to the movies since I was here last and had a really hard time getting up out of that low seat. OBJECTIVE    15 min Therapeutic Exercise:  [x] See flow sheet :   Rationale: increase ROM and increase strength to improve the patients ability to tolerate standing/walking activities    14 min Neuromuscular Re-education:  [x]  See flow sheet :   Rationale: improve balance and increase proprioception  to improve the patients ability toincrease confidence and stability with standing/ambulation ADL's        min Patient Education: [x] Review HEP    [] Progressed/Changed HEP based on:   [] positioning   [] body mechanics   [] transfers   [] heat/ice application        Other Objective/Functional Measures:     Pain Level (0-10 scale) post treatment: 0    ASSESSMENT/Changes in Function: Pt was able to resume performing standing LE strengthening PRE's with resistance today with min to mod challenge with strength and endurance. Pt is still somewhat impulsive and unsteady with ambulation style balance exercises especially with changing directions. Pt also presents with chief c/o difficulty with sit to stand from low surfaces.  Will continue to progress/advance patient within current POC as tolerated with monitoring symptoms. Patient will continue to benefit from skilled PT services to modify and progress therapeutic interventions, address functional mobility deficits, address ROM deficits, address strength deficits, analyze and cue movement patterns, address imbalance/dizziness and instruct in home and community integration to attain remaining goals. []  See Plan of Care  []  See progress note/recertification  []  See Discharge Summary         Progress towards goals / Updated goals:  · Goals for this certification period include and are to be achieved in   2-4  treatments: 1. Pt will demonstrate sit<->stand from standard chair height with minimal UE support x 1 attempt for improved ease with toilet transfers. 2.Pt will improve TUG to </= 17\" with least restrictive device for decreased fall risk.     PLAN  [x]  Upgrade activities as tolerated     []  Continue plan of care  []  Update interventions per flow sheet       []  Discharge due to:_  []  Other:_      Lindsay Florez PTA 12/3/2019  10:20 AM      Future Appointments   Date Time Provider Birdie Petty   12/6/2019  9:15 AM Jigna Copeland PTA [facility]

## 2019-12-06 ENCOUNTER — HOSPITAL ENCOUNTER (OUTPATIENT)
Dept: PHYSICAL THERAPY | Age: 79
Discharge: HOME | End: 2019-12-06
Payer: MEDICARE

## 2019-12-06 PROCEDURE — 97112 NEUROMUSCULAR REEDUCATION: CPT

## 2019-12-06 PROCEDURE — 97110 THERAPEUTIC EXERCISES: CPT

## 2019-12-06 NOTE — PROGRESS NOTES
PT DAILY TREATMENT NOTE     Patient Name: Yoon Mera  Date:2019  : 1940  [x]  Patient  Verified  Payor: Vel Urban / Plan: VA MEDICARE PART A & B / Product Type: Medicare /    In time:9:17  Out time:10:08  Total Treatment Time (min): 51  Total Timed Codes (min): 51  1:1 Treatment Time (min): 49   Visit #: 14 of     Treatment Area: Unsteadiness on feet [R26.81]  Left knee pain [M25.562]  Knee pain, right [M25.561]    SUBJECTIVE  Pain Level (0-10 scale): 0  Any medication changes, allergies to medications, adverse drug reactions, diagnosis change, or new procedure performed?: [x] No    [] Yes (see summary sheet for update)  Subjective functional status/changes:   [] No changes reported  My legs feel pretty good today, nothing new since I was here on Monday, but my balance still isn't the best.    OBJECTIVE      41 min Therapeutic Exercise:  [x] See flow sheet :   Rationale: increase ROM and increase strength to improve the patients ability to tolerate standing/walking activities     10 min Neuromuscular Re-education:  [x]  See flow sheet :   Rationale: improve balance and increase proprioception  to improve the patients ability toincrease confidence and stability with standing/ambulation ADL's            min Patient Education: [x] Review HEP    [] Progressed/Changed HEP based on:   [] positioning   [] body mechanics   [] transfers   [] heat/ice application        Other Objective/Functional Measures:     Pain Level (0-10 scale) post treatment: 0    ASSESSMENT/Changes in Function: Pt presents with improved sit to stand transfers with less momentum/improved form and was able to advance to addition of perturbation drills on level surface and foam for 90 seconds each with normal stance and moderate balance challenge with moderate challenge today. Will continue to progress/advance patient within current POC as tolerated with monitoring symptoms.     Patient will continue to benefit from skilled PT services to modify and progress therapeutic interventions, address functional mobility deficits, address ROM deficits, address strength deficits, analyze and cue movement patterns and address imbalance/dizziness to attain remaining goals. []  See Plan of Care  []  See progress note/recertification  []  See Discharge Summary         Progress towards goals / Updated goals:  · Goals for this certification period include and are to be achieved in   2-4  treatments: 1. Pt will demonstrate sit<->stand from standard chair height with minimal UE support x 1 attempt for improved ease with toilet transfers. 2.Pt will improve TUG to </= 17\" with least restrictive device for decreased fall risk.       PLAN  [x]  Upgrade activities as tolerated     []  Continue plan of care  []  Update interventions per flow sheet       []  Discharge due to:_  []  Other:_      Mary Morales, PTA 12/6/2019  9:33 AM      No future appointments.

## 2020-01-09 NOTE — PROGRESS NOTES
[facility] PHYSICAL THERAPY  [address], [place], [address] - Phone: [phone]  Fax: [phone] FOR PHYSICAL THERAPY          Patient Name: Chen Lyles : 1940   Treatment/Medical Diagnosis: Unsteadiness on feet [R26.81]  Left knee pain [M25.562]  Knee pain, right [M25.561]   Onset Date: Unsteadiness on feet [R26.81]  Left knee pain [M25.562]  Knee pain, right [M25.561]    Referral Source: Mirian Sullivan MD [facility]): 2019   Prior Hospitalization: See Medical History Provider #: [id number]   Prior Level of Function: Ambulated in home/community with [address]  Lives with dtr/grddtr in [place] in a condo with 2 RUT (landing between) no rails.    Comorbidities: Arthritis, Afib, osteoporosis, DM, HTN, [place] R ear, acoustic neuroma R, h/o pituitary CA s/p resection. Medications: Verified on Patient Summary List   Visits from [facility]: 14 Missed Visits: 0     Goal/Measure of Progress Goal Met? 1. Pt will improve FOTO score to >/= 44 to demo a significant improvement in functional activity tolerance. Status at last Eval: 41/100 (39/100 at initial evaluation) Current Status: 45/100 yes   2. Pt will demonstrate sit<->stand from standard chair height with minimal UE support x 1 attempt for improved ease with toilet transfers. Status at last Eval: Improved, but still needs moderate UE support and 2-3 attempts at times Current Status: Improved, but still needs moderate UE support and 2-3 attempts at times no   3. Pt will improve TUG to </= 15\" with least restrictive device for decreased fall risk.    Status at last Eval: TUG 23\" no AD (fall risk>14\") Current Status: TUG 20.5\" no AD (fall risk>14\") no        Key Functional Changes/Progress:   Patient reports approximately 75-80% overall improvement from therapy since initial evaluation with 7/10 pain level on average in right knee, increased to 8/10 at the worst with ascending from chair after prolonged sitting as well as with squatting type activities. Pt is still the safest with rollator walker as the least restrictive assistive device for community ambulation but could work towards being independent with straight cane household ambulation with more consistent attendance with therapy. Pt has had occasional falls at home at least once while attending therapy with choosing to ambulate around her home without assistive device in which she required assistance to get up from the floor. Pt was strongly advised against ambulation without assistive device around her home several times for her own safety. Knee strength measurements/MMT= Quads=4+/5; Hamstrings=5/5. TUG 20.5\" no AD (fall risk>14\")  Sit<->stand with multiple attempts, increased time and moderate UE assist from standard chair height    Assessments/Recommendations:   Pt is being discharged from therapy at this time due to self discharging to returning home in [place] after completing 14 of 14-16 prescribed visits. If you have any questions/comments please contact us directly at [phone]. Thank you for allowing us to assist in the care of your patient.     Therapist Signature: Carmencita Ferrell PTA/   MICHAEL Harris Date: 1/9/20   Reporting Period: 09/24/19-12-6/19 Time: 12:25 PM

## 2023-02-21 NOTE — PROGRESS NOTES
[address] [facility] [place] [address], [place], [address] [place] [address] Road  Phone: [phone]   [phone]  PLAN OF CARE / [address] [place] [address] PHYSICAL THERAPY SERVICES  Patient Name: Colleen Strickland : 1940   Medical   Diagnosis: Unsteadiness on feet [R26.81]  Left knee pain [M25.562]  Knee pain, right [M25.561] Treatment Diagnosis: Unsteadiness on feet [R26.81]  Left knee pain [M25.562]  Knee pain, right [M25.561]   Onset Date:      Referral Source: Unknown, Provider, MD Alber Cabrales MD [facility]): 2019   Prior Hospitalization: See medical history Provider #: [id number]   Prior Level of Function: Ambulated in home/community with SPC. Lives with dtr/grddtr in [place] in a condo with 2 RUT (landing between) no rails. Comorbidities: Arthritis, Afib, osteoporosis, DM, HTN, [place] R ear, acoustic neuroma R, h/o pituitary CA s/p resection. Medications: Verified on Patient Summary List   The Plan of Care and following information is based on the information from the initial evaluation.   ===========================================================================================  Assessment / key information:  Pt is a 78y.o. year old female with subjective complaints of unsteadiness on feet and b/l knee pain s/p fall in 2018 resulting in L femur fx s/p ORIF. Pt has had more recent fall in 2019 which she states occurred due to tripping; resulted in head injury and hospitalization x 4 days for brain hemorrhage; pt was d/c'd to STR x 2.5 wks, then to HHPT x 2 wks. Pt denies vertigo, lightheadedness, tinnitus. Current pain is rated as 0 to 10/10. Current functional limitations: standing, walking, sit to stand transfers. FOTO score= 39/100 indicating 61% impairment to functional activities.     Today's evaulation is significant for   1) POSTURE/OBSERVATION:  Dowagers hump, widened CHRISTIANE with standing/ambulation, b/l genu valgus, R knee flexed in stance. FUNCTIONAL ASSESSMENT: Gait with decreased gait speed ( 0.4 m/s indicating more likely to be hospitalized, intervention needed to reduce fall risks and placing her in limited community ambulator category) with rollator. Sit to stand with multiple attepmts and heavy use of UE's. 2) ROM grossly WFL throughout with limited thoracic extension due to t/s kyphosis/dowager's, limited b/l trunk ROT, R knee ext ~ -15 deg. 3) STRENGTH  Hip flex R 4*/5, L 4-*/5; ext Fair as per bridge to 50%; Knee Ext R 5-/5, L 5-/5; flex R 4+/5, L 4+/5; Ankle DF R 5/5, L 5/5.     4) SPECIAL TESTS: Tinetti 17/28 (<18 high fall risk); FGA 14/30 (<22 fall risk). Judithe [place] 5) ADDITIONAL FINDINGS: LLD RLE (ASIS to malleolus) 97.5 cm, L 94.3 cm; pt advised on OTC heel insert which we can help her utilize appropriately next session as she prefers to not pursue orthotist referral.    These findings are supportive for diagnosis of b/l knee pain and unsteadiness on feet.   Pt will be a good candidate for skilled PT to address these impairments and promote return to normal ADLs and functional mobility for improved quality of life.    ===========================================================================================  Eval Complexity: History HIGH Complexity :3+ comorbidities / personal factors will impact the outcome/ POC ;  Examination  HIGH Complexity : 4+ Standardized tests and measures addressing body structure, function, activity limitation and / or participation in recreation ; Presentation LOW Complexity : Stable, uncomplicated ;  Decision Making MEDIUM Complexity : FOTO score of 26-74; Overall Complexity LOW   Problem List: pain affecting function, decrease ROM, decrease strength, impaired gait/ balance, decrease ADL/ functional abilitiies, decrease activity tolerance, decrease flexibility/ joint mobility and decrease transfer abilities   Treatment Plan may include any combination of the following: Therapeutic exercise, Therapeutic activities, Neuromuscular re-education, Physical agent/modality, Gait/balance training, Manual therapy, Patient education, Self Care training, Functional mobility training, Home safety training and Stair training  Patient / Family readiness to learn indicated by: asking questions, trying to perform skills and interestasking questions, trying to perform skills and interest  Persons(s) to be included in education: patient (P)  Barriers to Learning/Limitations: None  Measures taken, if barriers to learning:    Patient Goal (s): \"ability to rise from sitting position, walk with better balance\". Patient self reported health status: fair  Rehabilitation Potential: good   Short Term Goals: To be accomplished in  2  weeks:  1. Pt will be educated in appropriate HEP to decrease pain, increase ROM, increase strength and return pt to PLOF. 2. Patient will report at least 25% functional improvement with standing, walking ADL's.  Long Term Goals: To be accomplished in  4-6  weeks:  1. Pt will improve FOTO score to >/= 44 to demo a significant improvement in functional activity tolerance. 2. Pt will achieve >/= +5 GROC in order to promote increased activity tolerance. 3.  Increase score on Tinetti to >/=  21/28 indicating decreased fall risk with ambulation. 4. Pt will demonstrate sit<->stand from standard chair height with minimal UE support x 1 attempt for improved ease with toilet transfers. Frequency / Duration:   Patient to be seen  2-3  times per week for 4-6  weeks:  Patient / Caregiver education and instruction: self care, activity modification and exercises  Therapist Signature: Maxim Ramirez PT Date: 2/13/2799   Certification Period: 09/24/19 to 12/22/19 Time: 10:42 AM   ===========================================================================================  I certify that the above Physical Therapy Services are being furnished while the patient is under my care. I agree with the treatment plan and certify that this therapy is necessary. Physician Signature:        Date:       Time:     Please sign and return to [facility] UNIT or you may fax the signed copy to [phone]. Thank you. Olanzapine Pregnancy And Lactation Text: This medication is pregnancy category C.   There are no adequate and well controlled trials with olanzapine in pregnant females.  Olanzapine should be used during pregnancy only if the potential benefit justifies the potential risk to the fetus.   In a study in lactating healthy women, olanzapine was excreted in breast milk.  It is recommended that women taking olanzapine should not breast feed.
